# Patient Record
Sex: MALE | Race: WHITE | NOT HISPANIC OR LATINO | Employment: UNEMPLOYED | URBAN - NONMETROPOLITAN AREA
[De-identification: names, ages, dates, MRNs, and addresses within clinical notes are randomized per-mention and may not be internally consistent; named-entity substitution may affect disease eponyms.]

---

## 2017-01-04 ENCOUNTER — DOCUMENTATION (OUTPATIENT)
Dept: CARDIOLOGY | Facility: CLINIC | Age: 77
End: 2017-01-04

## 2017-01-04 NOTE — PROGRESS NOTES
Eliquis free of charge from 1/3/2017 - 12/31/2017 per [employer] patient assistance foundation

## 2017-03-22 ENCOUNTER — TRANSCRIBE ORDERS (OUTPATIENT)
Dept: CARDIOLOGY | Facility: CLINIC | Age: 77
End: 2017-03-22

## 2017-03-22 DIAGNOSIS — I34.0 NON-RHEUMATIC MITRAL REGURGITATION: ICD-10-CM

## 2017-03-22 DIAGNOSIS — I35.1 NONRHEUMATIC AORTIC VALVE INSUFFICIENCY: Primary | ICD-10-CM

## 2017-06-09 ENCOUNTER — OFFICE VISIT (OUTPATIENT)
Dept: CARDIOLOGY | Facility: CLINIC | Age: 77
End: 2017-06-09

## 2017-06-09 VITALS
HEIGHT: 72 IN | BODY MASS INDEX: 18.42 KG/M2 | SYSTOLIC BLOOD PRESSURE: 122 MMHG | HEART RATE: 56 BPM | DIASTOLIC BLOOD PRESSURE: 68 MMHG | WEIGHT: 136 LBS

## 2017-06-09 DIAGNOSIS — G45.9 TRANSIENT CEREBRAL ISCHEMIC ATTACK, UNSPECIFIED: ICD-10-CM

## 2017-06-09 DIAGNOSIS — I35.1 NONRHEUMATIC AORTIC VALVE INSUFFICIENCY: Primary | ICD-10-CM

## 2017-06-09 DIAGNOSIS — I34.0 NON-RHEUMATIC MITRAL REGURGITATION: ICD-10-CM

## 2017-06-09 DIAGNOSIS — I10 ESSENTIAL HYPERTENSION: ICD-10-CM

## 2017-06-09 PROCEDURE — 99213 OFFICE O/P EST LOW 20 MIN: CPT | Performed by: INTERNAL MEDICINE

## 2017-06-09 NOTE — PROGRESS NOTES
Babar Choi  76 y.o. male    06/09/2017  1. Nonrheumatic aortic valve insufficiency    2. Non-rheumatic mitral regurgitation    3. Essential hypertension    4. 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  Echocardiographic study at that time was reported normal left and a systolic function with a mild aortic regurgitation.  Recent echocardiographic study reported to have good heart function with a mild mitral aortic pulmonic and tricuspid regurgitation without any hemodynamic significant.  Finding discussed with the patient and family.  He denies orthopnea, PND, nauseous, vomiting, diarrhea.  Denies any intermittent claudication, dysuria or hematuria.            SUBJECTIVE    No Known Allergies      Past Medical History:   Diagnosis Date   • Cardiac disease    • Hiatal hernia    • Hyperlipidemia    • Hypertension    • Stroke    • Transient cerebral ischemic attack, unspecified          Past Surgical History:   Procedure Laterality Date   • SKIN LESION EXCISION           No family history on file.      Social History     Social History   • Marital status:      Spouse name: N/A   • Number of children: N/A   • Years of education: N/A     Occupational History   • Not on file.     Social History Main Topics   • Smoking status: Never Smoker   • Smokeless tobacco: Never Used   • Alcohol use No   • Drug use: No   • Sexual activity: Defer     Other Topics Concern   • Not on file     Social History Narrative         Current Outpatient Prescriptions   Medication Sig Dispense Refill   • apixaban (ELIQUIS) 2.5 MG tablet tablet Take 1 tablet by mouth 2 (Two) Times a Day. 180 tablet 3   • aspirin 81 MG chewable  "tablet Chew 81 mg Daily.     • carvedilol (COREG) 3.125 MG tablet TAKE ONE TABLET BY MOUTH TWICE DAILY 180 tablet 2   • Cholecalciferol (VITAMIN D3) 5000 UNITS capsule capsule Take 5,000 Units by mouth Daily.     • Flaxseed, Linseed, (FLAXSEED OIL) 1000 MG capsule Take 1 tablet by mouth Daily.     • lisinopril (PRINIVIL,ZESTRIL) 20 MG tablet TAKE ONE TABLET BY MOUTH ONCE DAILY 90 tablet 3   • Lutein 6 MG capsule Take 1 tablet by mouth Daily.     • vitamin C (ASCORBIC ACID) 500 MG tablet Take 500 mg by mouth Daily.       No current facility-administered medications for this visit.          OBJECTIVE    /68  Pulse 56  Ht 72\" (182.9 cm)  Wt 136 lb (61.7 kg)  BMI 18.44 kg/m2        Review of Systems     Constitutional:  Denies recent weight loss, weight gain, fever or chills, no change in exercise tolerance     HENT:  Denies any hearing loss, epistaxis, hoarseness, or difficulty speaking.     Eyes: Wears eyeglasses or contact lenses     Respiratory:  Denies dyspnea with exertion,no cough, wheezing, or hemoptysis.     Cardiovascular: See H&P.     Gastrointestinal:  Denies change in bowel habits, dyspepsia, ulcer disease, hematochezia, or melena.     Endocrine: Negative for cold intolerance, heat intolerance, polydipsia, polyphagia and polyuria. Denies any history of weight change, heat/cold intolerance, polydipsia, polyuria     Genitourinary: Negative.      Musculoskeletal: Denies any history of arthritic symptoms or back problems     Skin:  Denies any change in hair or nails, rashes, or skin lesions.     Allergic/Immunologic: Negative.  Negative for environmental allergies, food allergies and immunocompromised state.     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     Psychiatric/Behavioral: Denies any history of depression, substance abuse, or change in cognitive function. "         Physical Exam     Constitutional: Cooperative, alert and oriented, well-developed, well-nourished, in no acute distress.     HENT:   Head: Normocephalic, normal hair patterns, no masses or tenderness.  Ears, Nose, and Throat: No gross abnormalities. No pallor or cyanosis. Dentition good.   Eyes: EOMS intact, PERRL, conjunctivae and lids unremarkable. Fundoscopic exam and visual fields not performed.   Neck: No palpable masses or adenopathy, no thyromegaly, no JVD, carotid pulses are full and equal bilaterally and without  Bruits.     Cardiovascular: Regular rhythm, S1 and S2 normal, no S3 or S4. Apical impulse not displaced. No murmurs, gallops, or rubs detected.     Pulmonary/Chest: Chest: normal symmetry, no tenderness to palpation, normal respiratory excursion, no intercostal retraction, no use of accessory muscles.            Pulmonary: Normal breath sounds. No rales or ronchi.    Abdominal: Abdomen soft, bowel sounds normoactive, no masses, no hepatosplenomegaly, non-tender, no bruits.     Musculoskeletal: No deformities, clubbing, cyanosis, erythema, or edema observed. There are no spinal abnormalities noted. Normal muscle strength and tone. Pulses full and equal in all extremities, no bruits auscultated.     Neurological: No gross motor or sensory deficits noted, affect appropriate, oriented to time, person, place.     Skin: Warm and dry to the touch, no apparent skin lesions or masses noted.     Psychiatric: He has a normal mood and affect. His behavior is normal. Judgment and thought content normal.         Procedures      No results found for: WBC, HGB, HCT, MCV, PLT  No results found for: GLUCOSE, BUN, CREATININE, EGFRIFNONA, EGFRIFAFRI, BCR, CO2, CALCIUM, PROTENTOTREF, ALBUMIN, LABIL2, AST, ALT  No results found for: CHOL  No results found for: TRIG  No results found for: HDL  No results found for: LDLCALC  No results found for: LDL  No results found for: HDLLDLRATIO  No components found for:  CHOLHDL  No results found for: HGBA1C  No results found for: TSH, N5YMKHT, M1ZTLWB, THYROIDAB        ASSESSMENT AND PLAN  #1 hypertension with hypertensive heart disease #2 mild mitral, aortic, mitral and tricuspid regurgitation #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 NO Evidence of active ischemia.  The patient will continue lisinopril with history of hypertension with good blood pressure control and carvedilol for the same reason.   on apixaban and aspirin 81 mg to do to decrease risk of for blood clot formations given the history of for internal jugular vein thrombus.  We will see her back in 8 month to one year R depends on patient clinical conditions.  Recent echocardiographic finding discussed with the patient.    Diagnoses and all orders for this visit:    Nonrheumatic aortic valve insufficiency    Non-rheumatic mitral regurgitation    Essential hypertension    Transient cerebral ischemic attack, unspecified        Sacha Narvaez MD  6/9/2017  10:39 AM

## 2017-06-27 RX ORDER — CARVEDILOL 3.12 MG/1
3.12 TABLET ORAL 2 TIMES DAILY WITH MEALS
Qty: 180 TABLET | Refills: 2 | Status: SHIPPED | OUTPATIENT
Start: 2017-06-27 | End: 2018-03-22 | Stop reason: SDUPTHER

## 2017-06-27 RX ORDER — CARVEDILOL 3.12 MG/1
TABLET ORAL
Qty: 180 TABLET | Refills: 0 | Status: CANCELLED | OUTPATIENT
Start: 2017-06-27

## 2017-09-28 RX ORDER — LISINOPRIL 20 MG/1
TABLET ORAL
Qty: 90 TABLET | Refills: 3 | Status: SHIPPED | OUTPATIENT
Start: 2017-09-28 | End: 2018-09-14 | Stop reason: SDUPTHER

## 2017-12-06 ENCOUNTER — TELEPHONE (OUTPATIENT)
Dept: CARDIOLOGY | Facility: CLINIC | Age: 77
End: 2017-12-06

## 2017-12-11 ENCOUNTER — TELEPHONE (OUTPATIENT)
Dept: CARDIOLOGY | Facility: CLINIC | Age: 77
End: 2017-12-11

## 2018-03-22 RX ORDER — CARVEDILOL 3.12 MG/1
TABLET ORAL
Qty: 180 TABLET | Refills: 2 | Status: SHIPPED | OUTPATIENT
Start: 2018-03-22 | End: 2019-01-07 | Stop reason: SDUPTHER

## 2018-06-04 ENCOUNTER — OFFICE VISIT (OUTPATIENT)
Dept: CARDIOLOGY | Facility: CLINIC | Age: 78
End: 2018-06-04

## 2018-06-04 VITALS
HEIGHT: 72 IN | BODY MASS INDEX: 18.96 KG/M2 | SYSTOLIC BLOOD PRESSURE: 126 MMHG | DIASTOLIC BLOOD PRESSURE: 74 MMHG | WEIGHT: 140 LBS | HEART RATE: 55 BPM

## 2018-06-04 DIAGNOSIS — I34.0 NON-RHEUMATIC MITRAL REGURGITATION: ICD-10-CM

## 2018-06-04 DIAGNOSIS — I10 ESSENTIAL HYPERTENSION: ICD-10-CM

## 2018-06-04 DIAGNOSIS — I35.1 NONRHEUMATIC AORTIC VALVE INSUFFICIENCY: ICD-10-CM

## 2018-06-04 DIAGNOSIS — G45.9 TRANSIENT CEREBRAL ISCHEMIA, UNSPECIFIED TYPE: Primary | ICD-10-CM

## 2018-06-04 PROCEDURE — 99213 OFFICE O/P EST LOW 20 MIN: CPT | Performed by: INTERNAL MEDICINE

## 2018-06-04 PROCEDURE — 93000 ELECTROCARDIOGRAM COMPLETE: CPT | Performed by: INTERNAL MEDICINE

## 2018-06-04 NOTE — PROGRESS NOTES
Babar Choi  77 y.o. 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  Echocardiographic study at that time was reported normal left and a systolic function with a mild aortic regurgitation.  echocardiographic study reported to have good heart function with a mild mitral aortic pulmonic and tricuspid regurgitation without any hemodynamic significant.    He denies orthopnea, PND, nauseous, vomiting, diarrhea.  Denies any intermittent claudication, dysuria or hematuria.    ECHO 6/2017  · Mild tricuspid valve regurgitation is present.  · Mild mitral valve regurgitation is present  · calcification of the aortic valve  · Mild aortic valve regurgitation is present.  · Left atrial cavity size is borderline dilated.  · Left ventricular systolic function is normal. Estimated EF = 55%.  · Left ventricular diastolic dysfunction (grade I) consistent with impaired relaxation.  · Mild pulmonic valve regurgitation is present    SUBJECTIVE    No Known Allergies      Past Medical History:   Diagnosis Date   • Cardiac disease    • Hiatal hernia    • Hyperlipidemia    • Hypertension    • Stroke    • Transient cerebral ischemic attack, unspecified          Past Surgical History:   Procedure Laterality Date   • SKIN LESION EXCISION           History reviewed. No pertinent family history.      Social History     Social History   • Marital status:      Spouse name: N/A   • Number of children: N/A   • Years of education: N/A     Occupational History   • Not on file.     Social History Main Topics   • Smoking status: Never Smoker   •  "Smokeless tobacco: Never Used   • Alcohol use No   • Drug use: No   • Sexual activity: Defer     Other Topics Concern   • Not on file     Social History Narrative   • No narrative on file         Current Outpatient Prescriptions   Medication Sig Dispense Refill   • apixaban (ELIQUIS) 2.5 MG tablet tablet Take 1 tablet by mouth 2 (Two) Times a Day. 180 tablet 3   • aspirin 81 MG chewable tablet Chew 81 mg Daily.     • carvedilol (COREG) 3.125 MG tablet TAKE ONE TABLET BY MOUTH TWICE DAILY WITH MEALS 180 tablet 2   • Cholecalciferol (VITAMIN D3) 5000 UNITS capsule capsule Take 5,000 Units by mouth Daily.     • FISH OIL-CANOLA OIL-VIT D3 PO Take 1 tablet by mouth Daily.     • lisinopril (PRINIVIL,ZESTRIL) 20 MG tablet TAKE ONE TABLET BY MOUTH ONCE DAILY 90 tablet 3   • Lutein 6 MG capsule Take 1 tablet by mouth Daily.     • Multiple Vitamins-Minerals (COMPLETE MULTIVITAMIN/MINERAL PO) Take 1 tablet by mouth Daily.     • vitamin C (ASCORBIC ACID) 500 MG tablet Take 500 mg by mouth Daily.       No current facility-administered medications for this visit.          OBJECTIVE    /74   Pulse 55   Ht 182.9 cm (72.01\")   Wt 63.5 kg (140 lb)   BMI 18.98 kg/m²         Review of Systems     Constitutional:  Denies recent weight loss, weight gain, fever or chills, no change in exercise tolerance     HENT:  Denies any hearing loss, epistaxis, hoarseness, or difficulty speaking.     Eyes: Wears eyeglasses or contact lenses     Respiratory:  Denies dyspnea with exertion,no cough, wheezing, or hemoptysis.     Cardiovascular: Negative for palpations, chest pain, orthopnea, PND, peripheral edema, syncope, or claudication.     Gastrointestinal:  Denies change in bowel habits, dyspepsia, ulcer disease, hematochezia, or melena.     Endocrine: Negative for cold intolerance, heat intolerance, polydipsia, polyphagia and polyuria. Denies any history of weight change, heat/cold intolerance, polydipsia, polyuria     Genitourinary: " Negative.      Musculoskeletal: Denies any history of arthritic symptoms or back problems     Skin:  Denies any change in hair or nails, rashes, or skin lesions.     Allergic/Immunologic: Negative.  Negative for environmental allergies, food allergies and immunocompromised state.     Neurological:  TIA    Hematological: Denies any food allergies, seasonal allergies, bleeding disorders, or lymphadenopathy.     Psychiatric/Behavioral: Denies any history of depression, substance abuse, or change in cognitive function.         Physical Exam     Constitutional: Cooperative, alert and oriented, well-developed, well-nourished, in no acute distress.     HENT:   Head: Normocephalic, normal hair patterns, no masses or tenderness.  Ears, Nose, and Throat: No gross abnormalities. No pallor or cyanosis. Dentition good.   Eyes: EOMS intact, PERRL, conjunctivae and lids unremarkable. Fundoscopic exam and visual fields not performed.   Neck: No palpable masses or adenopathy, no thyromegaly, no JVD, carotid pulses are full and equal bilaterally and without  Bruits.     Cardiovascular: Regular rhythm, S1 and S2 normal, no S3 or S4. Apical impulse not displaced. No murmurs, gallops, or rubs detected.     Pulmonary/Chest: Chest: normal symmetry, no tenderness to palpation, normal respiratory excursion, no intercostal retraction, no use of accessory muscles.            Pulmonary: Normal breath sounds. No rales or ronchi.    Abdominal: Abdomen soft, bowel sounds normoactive, no masses, no hepatosplenomegaly, non-tender, no bruits.     Musculoskeletal: No deformities, clubbing, cyanosis, erythema, or edema observed. There are no spinal abnormalities noted. Normal muscle strength and tone. Pulses full and equal in all extremities, no bruits auscultated.     Neurological: No gross motor or sensory deficits noted, affect appropriate, oriented to time, person, place.     Skin: Warm and dry to the touch, no apparent skin lesions or masses  noted.     Psychiatric: He has a normal mood and affect. His behavior is normal. Judgment and thought content normal.           ECG 12 Lead  Date/Time: 6/4/2018 1:44 PM  Performed by: SACHA SARKAR  Authorized by: SACHA SARKAR   Comparison: not compared with previous ECG   Rhythm: sinus rhythm  Conduction: incomplete RBBB  Comments: Sinus rhythm with incomplete right bundle-branch block morphology              No results found for: WBC, HGB, HCT, MCV, PLT  No results found for: GLUCOSE, BUN, CREATININE, EGFRIFNONA, EGFRIFAFRI, BCR, CO2, CALCIUM, PROTENTOTREF, ALBUMIN, LABIL2, AST, ALT  No results found for: CHOL  No results found for: TRIG  No results found for: HDL  No components found for: LDLCALC  No results found for: LDL  No results found for: HDLLDLRATIO  No components found for: CHOLHDL  No results found for: HGBA1C  No results found for: TSH, J4SYBGV, Q1NDNZY, THYROIDAB        ASSESSMENT AND PLAN  1 hypertension with hypertensive heart disease #2 mild mitral, aortic, mitral and tricuspid regurgitation #3 TIA secondary #4 internal jugular vein thrombus     Clinically no sign of any acute cardiac decompensation based the clinical history physical finding. NO Evidence of active ischemia.  The patient will continue lisinopril with history of hypertension with good blood pressure control and carvedilol for the same reason.   on apixaban and aspirin 81 mg to do to decrease risk of for blood clot formations given the history of for internal jugular vein thrombus.  We will see her back in 8 month to one year R depends on patient clinical conditions.  Recent echocardiographic finding discussed with the patient.    There are no diagnoses linked to this encounter.    Sacha Sarkar MD  6/4/2018  1:43 PM

## 2018-09-14 RX ORDER — LISINOPRIL 20 MG/1
TABLET ORAL
Qty: 90 TABLET | Refills: 3 | Status: SHIPPED | OUTPATIENT
Start: 2018-09-14 | End: 2019-09-16 | Stop reason: SDUPTHER

## 2018-12-10 ENCOUNTER — DOCUMENTATION (OUTPATIENT)
Dept: CARDIOLOGY | Facility: CLINIC | Age: 78
End: 2018-12-10

## 2018-12-10 NOTE — PROGRESS NOTES
Patient receiving assistance for Eliquis for the 2018 year.  Explained to patient I cannot fill out for his 2019 assistance until I get his 2018 taxes when he files them.  I told him if he needed samples between now and that to call me.  Patient states he has plenty and will call me when he does his taxes.

## 2018-12-19 ENCOUNTER — DOCUMENTATION (OUTPATIENT)
Dept: CARDIOLOGY | Facility: CLINIC | Age: 78
End: 2018-12-19

## 2019-01-07 RX ORDER — CARVEDILOL 3.12 MG/1
TABLET ORAL
Qty: 180 TABLET | Refills: 2 | Status: SHIPPED | OUTPATIENT
Start: 2019-01-07 | End: 2019-09-16 | Stop reason: SDUPTHER

## 2019-02-06 ENCOUNTER — DOCUMENTATION (OUTPATIENT)
Dept: CARDIOLOGY | Facility: CLINIC | Age: 79
End: 2019-02-06

## 2019-02-06 ENCOUNTER — OFFICE VISIT (OUTPATIENT)
Dept: CARDIOLOGY | Facility: CLINIC | Age: 79
End: 2019-02-06

## 2019-02-06 VITALS
WEIGHT: 138 LBS | BODY MASS INDEX: 18.69 KG/M2 | SYSTOLIC BLOOD PRESSURE: 128 MMHG | OXYGEN SATURATION: 99 % | HEIGHT: 72 IN | HEART RATE: 54 BPM | DIASTOLIC BLOOD PRESSURE: 72 MMHG

## 2019-02-06 DIAGNOSIS — I34.0 NON-RHEUMATIC MITRAL REGURGITATION: ICD-10-CM

## 2019-02-06 DIAGNOSIS — I10 ESSENTIAL HYPERTENSION: ICD-10-CM

## 2019-02-06 DIAGNOSIS — G45.9 TRANSIENT CEREBRAL ISCHEMIA, UNSPECIFIED TYPE: Primary | ICD-10-CM

## 2019-02-06 DIAGNOSIS — I35.1 NONRHEUMATIC AORTIC VALVE INSUFFICIENCY: ICD-10-CM

## 2019-02-06 PROCEDURE — 99213 OFFICE O/P EST LOW 20 MIN: CPT | Performed by: INTERNAL MEDICINE

## 2019-02-06 NOTE — PROGRESS NOTES
Babar Choi  78 y.o. male    02/06/2019  1. Transient cerebral ischemia, unspecified type    2. Nonrheumatic aortic valve insufficiency    3. Non-rheumatic mitral regurgitation    4. Essential hypertension        History of Present Illness:  .Patient's Body mass index is 18.71 kg/m². BMI is within normal parameters. No follow-up required..  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  Echocardiographic study at that time  reported normal left and a systolic function  with a mild mitral aortic pulmonic and tricuspid regurgitation without any hemodynamic significant.    He denies orthopnea, PND, nauseous, vomiting, diarrhea.  Denies any intermittent claudication, dysuria or hematuria.     ECHO 6/2017  · Mild tricuspid valve regurgitation is present.  · Mild mitral valve regurgitation is present  · calcification of the aortic valve  · Mild aortic valve regurgitation is present.  · Left atrial cavity size is borderline dilated.  · Left ventricular systolic function is normal. Estimated EF = 55%.  · Left ventricular diastolic dysfunction (grade I) consistent with impaired relaxation.  · Mild pulmonic valve regurgitation is present            SUBJECTIVE:    No Known Allergies      Past Medical History:   Diagnosis Date   • Cardiac disease    • Hiatal hernia    • Hyperlipidemia    • Hypertension    • Stroke (CMS/HCC)    • Transient cerebral ischemic attack, unspecified          Past Surgical History:   Procedure Laterality Date   • SKIN LESION EXCISION           No family history on file.      Social History     Socioeconomic History   • Marital status:      Spouse name: Not on file   • Number of children: Not on file   • Years of education: Not on file   • Highest education level: Not on file   Social Needs   • Financial  resource strain: Not on file   • Food insecurity - worry: Not on file   • Food insecurity - inability: Not on file   • Transportation needs - medical: Not on file   • Transportation needs - non-medical: Not on file   Occupational History   • Not on file   Tobacco Use   • Smoking status: Never Smoker   • Smokeless tobacco: Never Used   Substance and Sexual Activity   • Alcohol use: No   • Drug use: No   • Sexual activity: Defer   Other Topics Concern   • Not on file   Social History Narrative   • Not on file         Current Outpatient Medications   Medication Sig Dispense Refill   • apixaban (ELIQUIS) 2.5 MG tablet tablet Take 1 tablet by mouth Every 12 (Twelve) Hours. 180 tablet 4   • aspirin 81 MG chewable tablet Chew 81 mg Daily.     • carvedilol (COREG) 3.125 MG tablet TAKE 1 TABLET BY MOUTH TWICE DAILY WITH MEALS 180 tablet 2   • Cholecalciferol (VITAMIN D3) 5000 UNITS capsule capsule Take 5,000 Units by mouth Daily.     • FISH OIL-CANOLA OIL-VIT D3 PO Take 1 tablet by mouth Daily.     • lisinopril (PRINIVIL,ZESTRIL) 20 MG tablet TAKE ONE TABLET BY MOUTH ONCE DAILY 90 tablet 3   • Lutein 6 MG capsule Take 1 tablet by mouth Daily.     • Multiple Vitamins-Minerals (COMPLETE MULTIVITAMIN/MINERAL PO) Take 1 tablet by mouth Daily.     • vitamin C (ASCORBIC ACID) 500 MG tablet Take 500 mg by mouth Daily.       No current facility-administered medications for this visit.            Review of Systems:     Constitutional:  Denies recent weight loss, weight gain, fever or chills, no change in exercise tolerance.     HENT:  Denies any hearing loss, epistaxis, hoarseness, or difficulty speaking.     Eyes: Wears eyeglasses or contact lenses.    Respiratory:  Denies dyspnea with exertion,no cough, wheezing, or hemoptysis.     Cardiovascular: Negative for palpations, chest pain, orthopnea, PND, peripheral edema, syncope, or claudication.     Gastrointestinal:  Denies change in bowel habits, dyspepsia, ulcer disease,  "hematochezia, or melena.     Endocrine: Negative for cold intolerance, heat intolerance, polydipsia, polyphagia and polyuria. Denies any history of weight change, polydipsia, polyuria.     Genitourinary: Negative.      Musculoskeletal: Denies any history of arthritic symptoms or back problems.     Skin:  Denies any change in hair or nails, rashes, or skin lesions.     Allergic/Immunologic: Negative.  Negative for environmental allergies, food allergies and immunocompromised state.     Neurological:  Possible TIA     Hematological: Denies any food allergies, seasonal allergies, bleeding disorders, or lymphadenopathy.     Psychiatric/Behavioral: Denies any history of depression, substance abuse, or change in cognitive function.       OBJECTIVE:    /72   Pulse 54   Ht 182.9 cm (72.01\")   Wt 62.6 kg (138 lb)   SpO2 99%   BMI 18.71 kg/m²       Physical Exam:     Constitutional: Cooperative, alert and oriented, well-developed, well-nourished, in no acute distress.     HENT:   Head: Normocephalic, normal hair patterns, no masses or tenderness.  Ears, Nose, and Throat: No gross abnormalities. No pallor or cyanosis. Dentition good.   Eyes: EOMS intact, PERRL, conjunctivae and lids unremarkable. Fundoscopic exam and visual fields not performed.   Neck: No palpable masses or adenopathy, no thyromegaly, no JVD, carotid pulses are full and equal bilaterally and without  Bruits.     Cardiovascular: Regular rhythm, S1 and S2 normal, no S3 or S4. Apical impulse not displaced. No murmurs, gallops, or rubs detected.     Pulmonary/Chest: Chest: normal symmetry, no tenderness to palpation, normal respiratory excursion, no intercostal retraction, no use of accessory muscles. Pulmonary: Normal breath sounds. No rales or rhonchi.    Abdominal: Abdomen soft, bowel sounds normoactive, no masses, no hepatosplenomegaly, non-tender, no bruits.     Musculoskeletal: No deformities, clubbing, cyanosis, erythema, or edema observed. " There are no spinal abnormalities noted. Normal muscle strength and tone. Pulses full and equal in all extremities, no bruits auscultated.     Neurological: No gross motor or sensory deficits noted, affect appropriate, oriented to time, person, place.     Skin: Warm and dry to the touch, no apparent skin lesions or masses noted.     Psychiatric: He has a normal mood and affect. His behavior is normal. Judgment and thought content normal.         Procedures      No results found for: WBC, HGB, HCT, MCV, PLT  No results found for: GLUCOSE, BUN, CREATININE, EGFRIFNONA, EGFRIFAFRI, BCR, CO2, CALCIUM, PROTENTOTREF, ALBUMIN, LABIL2, AST, ALT  No results found for: CHOL  No results found for: TRIG  No results found for: HDL  No components found for: LDLCALC  No results found for: LDL  No results found for: HDLLDLRATIO  No components found for: CHOLHDL  No results found for: HGBA1C  No results found for: TSH, D4KWSEX, P2ZJUKO, THYROIDAB        ASSESSMENT AND PLAN:    1 hypertension with hypertensive heart disease #2 mild mitral, aortic, mitral and tricuspid regurgitation #3 TIA secondary #4 internal jugular vein thrombus     Clinically no sign of any acute cardiac decompensation based the clinical history physical finding. NO Evidence of active ischemia.  The patient will continue lisinopril with history of hypertension with good blood pressure control and carvedilol for the same reason.   on apixaban and aspirin 81 mg to do to decrease risk of for blood clot formations given the history of for internal jugular vein thrombus.  We will see her back in 8 month to one year R depends on patient clinical conditions.  Recent echocardiographic finding discussed with the patient.      Babar was seen today for follow-up.    Diagnoses and all orders for this visit:    Transient cerebral ischemia, unspecified type    Nonrheumatic aortic valve insufficiency    Non-rheumatic mitral regurgitation    Essential hypertension        Goncalves  MD Brice  2/6/2019  8:56 AM

## 2019-09-16 RX ORDER — CARVEDILOL 3.12 MG/1
TABLET ORAL
Qty: 180 TABLET | Refills: 2 | Status: SHIPPED | OUTPATIENT
Start: 2019-09-16 | End: 2020-06-08

## 2019-09-16 RX ORDER — LISINOPRIL 20 MG/1
TABLET ORAL
Qty: 90 TABLET | Refills: 3 | Status: SHIPPED | OUTPATIENT
Start: 2019-09-16 | End: 2020-06-08

## 2019-10-08 ENCOUNTER — OFFICE VISIT (OUTPATIENT)
Dept: CARDIOLOGY | Facility: CLINIC | Age: 79
End: 2019-10-08

## 2019-10-08 VITALS
WEIGHT: 122 LBS | HEIGHT: 72 IN | DIASTOLIC BLOOD PRESSURE: 72 MMHG | OXYGEN SATURATION: 99 % | HEART RATE: 52 BPM | SYSTOLIC BLOOD PRESSURE: 134 MMHG | BODY MASS INDEX: 16.52 KG/M2

## 2019-10-08 DIAGNOSIS — G45.9 TRANSIENT CEREBRAL ISCHEMIA, UNSPECIFIED TYPE: Primary | ICD-10-CM

## 2019-10-08 DIAGNOSIS — I10 ESSENTIAL HYPERTENSION: ICD-10-CM

## 2019-10-08 DIAGNOSIS — I35.1 NONRHEUMATIC AORTIC VALVE INSUFFICIENCY: ICD-10-CM

## 2019-10-08 DIAGNOSIS — I34.0 NON-RHEUMATIC MITRAL REGURGITATION: ICD-10-CM

## 2019-10-08 PROCEDURE — 99213 OFFICE O/P EST LOW 20 MIN: CPT | Performed by: INTERNAL MEDICINE

## 2019-10-08 NOTE — PROGRESS NOTES
Babar Choi  79 y.o. male    10/08/2019  1. Transient cerebral ischemia, unspecified type    2. Essential hypertension    3. Non-rheumatic mitral regurgitation    4. Nonrheumatic aortic valve insufficiency        History of Present Illness:    Patient's Body mass index is 16.55 kg/m². BMI is below normal parameters. Recommendations include: referral to primary care.    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  Echocardiographic study at that time  reported normal left and a systolic function  with a mild mitral aortic pulmonic and tricuspid regurgitation without any hemodynamic significant.    He denies orthopnea, PND, nauseous, vomiting, diarrhea.  Denies any intermittent claudication, dysuria or hematuria.     ECHO 6/2017  · Mild tricuspid valve regurgitation is present.  · Mild mitral valve regurgitation is present  · calcification of the aortic valve  · Mild aortic valve regurgitation is present.  · Left atrial cavity size is borderline dilated.  · Left ventricular systolic function is normal. Estimated EF = 55%.  · Left ventricular diastolic dysfunction (grade I) consistent with impaired relaxation.  · Mild pulmonic valve regurgitation is present    SUBJECTIVE:    No Known Allergies      Past Medical History:   Diagnosis Date   • Cardiac disease    • Hiatal hernia    • Hyperlipidemia    • Hypertension    • Stroke (CMS/HCC)    • Transient cerebral ischemic attack, unspecified          Past Surgical History:   Procedure Laterality Date   • SKIN LESION EXCISION           History reviewed. No pertinent family history.      Social History     Socioeconomic History   • Marital status:      Spouse name: Not on file   • Number of children: Not on file   • Years of education: Not on file   • Highest education level: Not  on file   Tobacco Use   • Smoking status: Never Smoker   • Smokeless tobacco: Never Used   Substance and Sexual Activity   • Alcohol use: No   • Drug use: No   • Sexual activity: Defer         Current Outpatient Medications   Medication Sig Dispense Refill   • apixaban (ELIQUIS) 2.5 MG tablet tablet Take 1 tablet by mouth Every 12 (Twelve) Hours. 180 tablet 4   • aspirin 81 MG chewable tablet Chew 81 mg Daily.     • carvedilol (COREG) 3.125 MG tablet TAKE 1 TABLET BY MOUTH TWICE DAILY WITH MEALS 180 tablet 2   • Cholecalciferol (VITAMIN D3) 5000 UNITS capsule capsule Take 5,000 Units by mouth Daily.     • FISH OIL-CANOLA OIL-VIT D3 PO Take 1 tablet by mouth Daily.     • lisinopril (PRINIVIL,ZESTRIL) 20 MG tablet TAKE 1 TABLET BY MOUTH ONCE DAILY 90 tablet 3   • Lutein 6 MG capsule Take 1 tablet by mouth Daily.     • Multiple Vitamins-Minerals (COMPLETE MULTIVITAMIN/MINERAL PO) Take 1 tablet by mouth Daily. Patient takes it only 3 days per week     • vitamin C (ASCORBIC ACID) 500 MG tablet Take 500 mg by mouth Daily.       No current facility-administered medications for this visit.            Review of Systems:     Constitutional:  Denies recent weight loss, weight gain, fever or chills, no change in exercise tolerance.     HENT:  Denies any hearing loss, epistaxis, hoarseness, or difficulty speaking.     Eyes: Wears eyeglasses or contact lenses.    Respiratory:  Denies dyspnea with exertion,no cough, wheezing, or hemoptysis.     Cardiovascular: Negative for palpations, chest pain, orthopnea, PND, peripheral edema, syncope, or claudication.     Gastrointestinal:  Denies change in bowel habits, dyspepsia, ulcer disease, hematochezia, or melena.     Endocrine: Negative for cold intolerance, heat intolerance, polydipsia, polyphagia and polyuria. Denies any history of weight change, polydipsia, polyuria.     Genitourinary: Negative.      Musculoskeletal: Denies any history of arthritic symptoms or back problems.  "    Skin:  Denies any change in hair or nails, rashes, or skin lesions.     Allergic/Immunologic: Negative.  Negative for environmental allergies, food allergies and immunocompromised state.     Neurological: TIA    Hematological: Denies any food allergies, seasonal allergies, bleeding disorders, or lymphadenopathy.     Psychiatric/Behavioral: Denies any history of depression, substance abuse, or change in cognitive function.       OBJECTIVE:    /72   Pulse 52   Ht 182.9 cm (72\")   Wt 55.3 kg (122 lb)   SpO2 99%   BMI 16.55 kg/m²       Physical Exam:     Constitutional: Cooperative, alert and oriented, well-developed, well-nourished, in no acute distress.     HENT:   Head: Normocephalic, normal hair patterns, no masses or tenderness.  Ears, Nose, and Throat: No gross abnormalities. No pallor or cyanosis. Dentition good.   Eyes: EOMS intact, PERRL, conjunctivae and lids unremarkable. Fundoscopic exam and visual fields not performed.   Neck: No palpable masses or adenopathy, no thyromegaly, no JVD, carotid pulses are full and equal bilaterally and without  Bruits.     Cardiovascular: Regular rhythm, S1 and S2 normal, no S3 or S4. Apical impulse not displaced. No murmurs, gallops, or rubs detected.     Pulmonary/Chest: Chest: normal symmetry, no tenderness to palpation, normal respiratory excursion, no intercostal retraction, no use of accessory muscles. Pulmonary: Normal breath sounds. No rales or rhonchi.    Abdominal: Abdomen soft, bowel sounds normoactive, no masses, no hepatosplenomegaly, non-tender, no bruits.     Musculoskeletal: No deformities, clubbing, cyanosis, erythema, or edema observed. There are no spinal abnormalities noted. Normal muscle strength and tone. Pulses full and equal in all extremities, no bruits auscultated.     Neurological: No gross motor or sensory deficits noted, affect appropriate, oriented to time, person, place.     Skin: Warm and dry to the touch, no apparent skin lesions " or masses noted.     Psychiatric: He has a normal mood and affect. His behavior is normal. Judgment and thought content normal.         Procedures      No results found for: WBC, HGB, HCT, MCV, PLT  No results found for: GLUCOSE, BUN, CREATININE, EGFRIFNONA, EGFRIFAFRI, BCR, CO2, CALCIUM, PROTENTOTREF, ALBUMIN, LABIL2, AST, ALT  No results found for: CHOL  No results found for: TRIG  No results found for: HDL  No components found for: LDLCALC  No results found for: LDL  No results found for: HDLLDLRATIO  No components found for: CHOLHDL  No results found for: HGBA1C  No results found for: TSH, F8FUNML, P5ITTQV, THYROIDAB        ASSESSMENT AND PLAN:    1 hypertension with hypertensive heart disease   #2 mild mitral, aortic, mitral and tricuspid regurgitation   #3 TIA secondary #4 internal jugular vein thrombus     Clinically no sign of any acute cardiac decompensation based the clinical history physical finding. NO Evidence of active ischemia.  The patient will continue lisinopril with history of hypertension with good blood pressure control and carvedilol for the same reason.   on apixaban and aspirin 81 mg to do to decrease risk of for blood clot formations given the history of for internal jugular vein thrombus.  We will see her back in 8 month to one year R depends on patient clinical conditions.      Babar was seen today for follow-up.    Diagnoses and all orders for this visit:    Transient cerebral ischemia, unspecified type    Essential hypertension    Non-rheumatic mitral regurgitation    Nonrheumatic aortic valve insufficiency        Sacha Narvaez MD  10/8/2019  11:09 AM

## 2019-12-02 ENCOUNTER — TELEPHONE (OUTPATIENT)
Dept: CARDIOLOGY | Facility: CLINIC | Age: 79
End: 2019-12-02

## 2019-12-02 NOTE — TELEPHONE ENCOUNTER
I called patient and let him know I got his papers and will be sending them in this week.         ----- Message from Miryam Uriarte sent at 12/2/2019  2:48 PM CST -----  Regarding: papers  Contact: [phone]  Pt called wanting to know if you got his papers he left and sent back in yet

## 2019-12-12 ENCOUNTER — DOCUMENTATION (OUTPATIENT)
Dept: CARDIOLOGY | Facility: CLINIC | Age: 79
End: 2019-12-12

## 2020-01-07 ENCOUNTER — DOCUMENTATION (OUTPATIENT)
Dept: CARDIOLOGY | Facility: CLINIC | Age: 80
End: 2020-01-07

## 2020-01-07 NOTE — PROGRESS NOTES
Patient's assistance for Eliquis has bee approved through 12/31/2020.  Approval has been put into scan.

## 2020-06-08 RX ORDER — LISINOPRIL 20 MG/1
TABLET ORAL
Qty: 90 TABLET | Refills: 0 | Status: SHIPPED | OUTPATIENT
Start: 2020-06-08 | End: 2020-12-03

## 2020-06-08 RX ORDER — CARVEDILOL 3.12 MG/1
TABLET ORAL
Qty: 180 TABLET | Refills: 0 | Status: SHIPPED | OUTPATIENT
Start: 2020-06-08 | End: 2020-09-21

## 2020-06-16 DIAGNOSIS — G45.9 TRANSIENT CEREBRAL ISCHEMIA, UNSPECIFIED TYPE: ICD-10-CM

## 2020-06-16 DIAGNOSIS — I10 ESSENTIAL HYPERTENSION: Primary | ICD-10-CM

## 2020-06-17 ENCOUNTER — OFFICE VISIT (OUTPATIENT)
Dept: CARDIOLOGY | Facility: CLINIC | Age: 80
End: 2020-06-17

## 2020-06-17 VITALS
BODY MASS INDEX: 18.56 KG/M2 | WEIGHT: 137 LBS | HEART RATE: 59 BPM | SYSTOLIC BLOOD PRESSURE: 122 MMHG | HEIGHT: 72 IN | OXYGEN SATURATION: 99 % | DIASTOLIC BLOOD PRESSURE: 72 MMHG

## 2020-06-17 DIAGNOSIS — I34.0 NON-RHEUMATIC MITRAL REGURGITATION: ICD-10-CM

## 2020-06-17 DIAGNOSIS — I35.1 NONRHEUMATIC AORTIC VALVE INSUFFICIENCY: ICD-10-CM

## 2020-06-17 DIAGNOSIS — G45.9 TRANSIENT CEREBRAL ISCHEMIA, UNSPECIFIED TYPE: ICD-10-CM

## 2020-06-17 DIAGNOSIS — I10 ESSENTIAL HYPERTENSION: Primary | ICD-10-CM

## 2020-06-17 PROCEDURE — 99213 OFFICE O/P EST LOW 20 MIN: CPT | Performed by: INTERNAL MEDICINE

## 2020-06-17 PROCEDURE — 93000 ELECTROCARDIOGRAM COMPLETE: CPT | Performed by: INTERNAL MEDICINE

## 2020-06-17 NOTE — PROGRESS NOTES
Babar Choi  79 y.o. male    6/17/2020     1. Essential hypertension    2. Non-rheumatic mitral regurgitation    3. Nonrheumatic aortic valve insufficiency    4. Transient cerebral ischemia, unspecified type        History of Present Illness:    Patient's Body mass index is 18.58 kg/m². BMI is below normal parameters. Recommendations include: referral to primary care.    80 years old patient presented for routine follow-up from a cardiac point of view no pain remarkably well.  No further TIA symptoms reported by the patient evaluated by me at [facility] when he admitted with the lightheaded and dizziness thought to be TIA underwent neurological workup noted to have internal jugular vein thrombus started on anticoagulations.  Echo  reported normal left and a systolic function  with a mild mitral aortic pulmonic and tricuspid regurgitation without any hemodynamic significant.    He denies orthopnea, PND, nauseous, vomiting, diarrhea.  Denies any intermittent claudication, dysuria or hematuria.     ECHO 6/2017  · Mild tricuspid valve regurgitation is present.  · Mild mitral valve regurgitation is present  · calcification of the aortic valve  · Mild aortic valve regurgitation is present.  · Left atrial cavity size is borderline dilated.  · Left ventricular systolic function is normal. Estimated EF = 55%.  · Left ventricular diastolic dysfunction (grade I) consistent with impaired relaxation.  · Mild pulmonic valve regurgitation is present    SUBJECTIVE:    No Known Allergies      Past Medical History:   Diagnosis Date   • Cardiac disease    • Hiatal hernia    • Hyperlipidemia    • Hypertension    • Stroke (CMS/HCC)    • Transient cerebral ischemic attack, unspecified          Past Surgical History:   Procedure Laterality Date   • SKIN LESION EXCISION           History reviewed. No pertinent family history.      Social History     Socioeconomic History   • Marital status:      Spouse name: Not on file    • Number of children: Not on file   • Years of education: Not on file   • Highest education level: Not on file   Tobacco Use   • Smoking status: Never Smoker   • Smokeless tobacco: Never Used   Substance and Sexual Activity   • Alcohol use: No   • Drug use: No   • Sexual activity: Defer         Current Outpatient Medications   Medication Sig Dispense Refill   • apixaban (ELIQUIS) 2.5 MG tablet tablet Take 1 tablet by mouth Every 12 (Twelve) Hours. 180 tablet 3   • aspirin 81 MG chewable tablet Chew 81 mg Daily.     • carvedilol (COREG) 3.125 MG tablet TAKE 1 TABLET BY MOUTH TWICE DAILY WITH MEALS 180 tablet 0   • Cholecalciferol (VITAMIN D3) 5000 UNITS capsule capsule Take 5,000 Units by mouth Daily.     • FISH OIL-CANOLA OIL-VIT D3 PO Take 1 tablet by mouth Daily.     • lisinopril (PRINIVIL,ZESTRIL) 20 MG tablet Take 1 tablet by mouth once daily 90 tablet 0   • Lutein 6 MG capsule Take 1 tablet by mouth Daily.     • Multiple Vitamins-Minerals (COMPLETE MULTIVITAMIN/MINERAL PO) Take 1 tablet by mouth Daily. Patient takes it only 3 days per week     • vitamin C (ASCORBIC ACID) 500 MG tablet Take 500 mg by mouth Daily.       No current facility-administered medications for this visit.            Review of Systems:     Constitutional:  Denies recent weight loss, weight gain, fever or chills, no change in exercise tolerance.     HENT:  Denies any hearing loss, epistaxis, hoarseness, or difficulty speaking.     Eyes: No blurring    Respiratory:  Denies dyspnea with exertion,no cough, wheezing, or hemoptysis.     Cardiovascular: See H&P  Gastrointestinal:  Denies change in bowel habits, dyspepsia, ulcer disease, hematochezia, or melena.     Endocrine: Negative for cold intolerance, heat intolerance, polydipsia, polyphagia and polyuria. Denies any history of weight change, polydipsia, polyuria.     Genitourinary: Negative.      Musculoskeletal: Denies any history of arthritic symptoms or back problems.     Skin:  Denies  "any change in hair or nails, rashes, or skin lesions.     Allergic/Immunologic: Negative.  Negative for environmental allergies, food allergies and immunocompromised state.     Neurological: TIA    Hematological: Denies any food allergies, seasonal allergies, bleeding disorders, or lymphadenopathy.     Psychiatric/Behavioral: Denies any history of depression, substance abuse, or change in cognitive function.       OBJECTIVE:    /72   Pulse 59   Ht 182.9 cm (72\")   Wt 62.1 kg (137 lb)   SpO2 99%   BMI 18.58 kg/m²       Physical Exam:     Constitutional: Cooperative, alert and oriented, well-developed, well-nourished, in no acute distress.     HENT:   Head: Normocephalic, normal hair patterns, no masses or tenderness.  Ears, Nose, and Throat: No gross abnormalities. No pallor or cyanosis. Dentition good.   Eyes: EOMS intact, PERRL, conjunctivae and lids unremarkable. Fundoscopic exam and visual fields not performed.   Neck: No palpable masses or adenopathy, no thyromegaly, no JVD, carotid pulses are full and equal bilaterally and without  Bruits.     Cardiovascular: Regular rhythm, S1 and S2 normal, no S3 or S4. Apical impulse not displaced. No murmurs, gallops, or rubs detected.     Pulmonary/Chest: Chest: normal symmetry, no tenderness to palpation, normal respiratory excursion, no intercostal retraction, no use of accessory muscles. Pulmonary: Normal breath sounds. No rales or rhonchi.    Abdominal: Abdomen soft, bowel sounds normoactive, no masses, no hepatosplenomegaly, non-tender, no bruits.     Musculoskeletal: No deformities, clubbing, cyanosis, erythema, or edema observed. There are no spinal abnormalities noted. Normal muscle strength and tone. Pulses full and equal in all extremities, no bruits auscultated.     Neurological: No gross motor or sensory deficits noted, affect appropriate, oriented to time, person, place.     Skin: Warm and dry to the touch, no apparent skin lesions or masses noted. "     Psychiatric: He has a normal mood and affect. His behavior is normal. Judgment and thought content normal.         Procedures      No results found for: WBC, HGB, HCT, MCV, PLT  No results found for: GLUCOSE, BUN, CREATININE, EGFRIFNONA, EGFRIFAFRI, BCR, CO2, CALCIUM, PROTENTOTREF, ALBUMIN, LABIL2, BILIRUBIN, AST, ALT  No results found for: CHOL  No results found for: TRIG  No results found for: HDL  No components found for: LDLCALC  No results found for: LDL  No results found for: HDLLDLRATIO  No components found for: CHOLHDL  No results found for: HGBA1C  No results found for: TSH, F5VHYLP, I2SBADN, THYROIDAB        ASSESSMENT AND PLAN:    1 hypertension with hypertensive heart disease good blood pressure control will continue lisinopril and carvedilol  #2 mild mitral, aortic, mitral and tricuspid regurgitation   #3 TIA no further recurrence on anticoagulation   #4 internal jugular vein thrombus on anticoagulation     Clinically no sign of any acute cardiac decompensation based the clinical history physical finding. NO Evidence of active ischemia.  The patient will continue lisinopril with history of hypertension with good blood pressure control and carvedilol for the same reason.   on apixaban and aspirin 81 mg to do to decrease risk of for blood clot formations given the history of for internal jugular vein thrombus.  We will see her back in 12 month  oR depends on patient clinical conditions.      Babar was seen today for transient cerebral ischemia, unspecified type +3 more.    Diagnoses and all orders for this visit:    Essential hypertension    Non-rheumatic mitral regurgitation    Nonrheumatic aortic valve insufficiency    Transient cerebral ischemia, unspecified type        Sacha Narvaez MD  6/17/2020  10:16

## 2020-09-21 RX ORDER — CARVEDILOL 3.12 MG/1
TABLET ORAL
Qty: 180 TABLET | Refills: 0 | Status: SHIPPED | OUTPATIENT
Start: 2020-09-21 | End: 2020-12-03

## 2020-12-03 RX ORDER — LISINOPRIL 20 MG/1
TABLET ORAL
Qty: 90 TABLET | Refills: 0 | Status: SHIPPED | OUTPATIENT
Start: 2020-12-03 | End: 2021-03-15 | Stop reason: SDUPTHER

## 2020-12-03 RX ORDER — CARVEDILOL 3.12 MG/1
TABLET ORAL
Qty: 180 TABLET | Refills: 0 | Status: SHIPPED | OUTPATIENT
Start: 2020-12-03 | End: 2021-03-15 | Stop reason: SDUPTHER

## 2020-12-09 RX ORDER — APIXABAN 2.5 MG/1
TABLET, FILM COATED ORAL
Qty: 180 TABLET | Refills: 0 | Status: SHIPPED | OUTPATIENT
Start: 2020-12-09 | End: 2021-01-04 | Stop reason: SDUPTHER

## 2021-01-06 ENCOUNTER — TELEPHONE (OUTPATIENT)
Dept: CARDIOLOGY | Facility: CLINIC | Age: 81
End: 2021-01-06

## 2021-03-15 NOTE — TELEPHONE ENCOUNTER
----- Message from Ryan Rodriguez sent at 3/15/2021 11:55 AM CDT -----  Dr. Brice Solis in [place]     Needs lisinopril and carvedilol would like to get 90 days at a time.  He is almost out and needs filled ASAP.        Thanks    Ryan

## 2021-03-16 RX ORDER — CARVEDILOL 3.12 MG/1
3.12 TABLET ORAL 2 TIMES DAILY WITH MEALS
Qty: 180 TABLET | Refills: 3 | Status: SHIPPED | OUTPATIENT
Start: 2021-03-16 | End: 2022-02-28

## 2021-03-16 RX ORDER — LISINOPRIL 20 MG/1
20 TABLET ORAL DAILY
Qty: 90 TABLET | Refills: 3 | Status: SHIPPED | OUTPATIENT
Start: 2021-03-16 | End: 2022-02-28

## 2021-06-18 ENCOUNTER — OFFICE VISIT (OUTPATIENT)
Dept: CARDIOLOGY | Facility: CLINIC | Age: 81
End: 2021-06-18

## 2021-06-18 VITALS
SYSTOLIC BLOOD PRESSURE: 130 MMHG | DIASTOLIC BLOOD PRESSURE: 80 MMHG | OXYGEN SATURATION: 100 % | TEMPERATURE: 97.8 F | BODY MASS INDEX: 18.72 KG/M2 | HEART RATE: 62 BPM | HEIGHT: 72 IN | WEIGHT: 138.2 LBS

## 2021-06-18 DIAGNOSIS — I34.0 NON-RHEUMATIC MITRAL REGURGITATION: ICD-10-CM

## 2021-06-18 DIAGNOSIS — I35.1 NONRHEUMATIC AORTIC VALVE INSUFFICIENCY: ICD-10-CM

## 2021-06-18 DIAGNOSIS — G45.9 TRANSIENT CEREBRAL ISCHEMIA, UNSPECIFIED TYPE: ICD-10-CM

## 2021-06-18 DIAGNOSIS — I10 ESSENTIAL HYPERTENSION: Primary | ICD-10-CM

## 2021-06-18 PROCEDURE — 93000 ELECTROCARDIOGRAM COMPLETE: CPT | Performed by: INTERNAL MEDICINE

## 2021-06-18 PROCEDURE — 99213 OFFICE O/P EST LOW 20 MIN: CPT | Performed by: INTERNAL MEDICINE

## 2021-06-18 NOTE — PROGRESS NOTES
Babar Choi  80 y.o. male    6/18/2021     1. Essential hypertension    2. Transient cerebral ischemia, unspecified type    3. Non-rheumatic mitral regurgitation    4. Nonrheumatic aortic valve insufficiency        History of Present Illness:    Patient's Body mass index is 18.74 kg/m². BMI is below normal parameters. Recommendations include: referral to primary care.    80 years old patient presented for routine follow-up with no symptom suggestive of cardiac decompensation such orthopnea PND chest pain syncope or intermittent claudication with a background history of internal jugular vein thrombosis/TIA, hypertension with hypertensive heart disease, mild aortic and pulmonic regurgitation with preserved left and systolic function.  During the office sinus rhythm with a prolonged MT interval and right bundle branch block morphology       ECHO 6/2017  · Mild tricuspid valve regurgitation is present.  · Mild mitral valve regurgitation is present  · calcification of the aortic valve  · Mild aortic valve regurgitation is present.  · Left atrial cavity size is borderline dilated.  · Left ventricular systolic function is normal. Estimated EF = 55%.  · Left ventricular diastolic dysfunction (grade I) consistent with impaired relaxation.  · Mild pulmonic valve regurgitation is present    SUBJECTIVE:    No Known Allergies      Past Medical History:   Diagnosis Date   • Cardiac disease    • Hiatal hernia    • Hyperlipidemia    • Hypertension    • Stroke (CMS/HCC)    • Transient cerebral ischemic attack, unspecified          Past Surgical History:   Procedure Laterality Date   • SKIN LESION EXCISION           History reviewed. No pertinent family history.      Social History     Socioeconomic History   • Marital status:      Spouse name: Not on file   • Number of children: Not on file   • Years of education: Not on file   • Highest education level: Not on file   Tobacco Use   • Smoking status: Never Smoker   •  Smokeless tobacco: Never Used   Substance and Sexual Activity   • Alcohol use: No   • Drug use: No   • Sexual activity: Defer         Current Outpatient Medications   Medication Sig Dispense Refill   • apixaban (Eliquis) 2.5 MG tablet tablet Take 1 tablet by mouth Every 12 (Twelve) Hours. 180 tablet 3   • aspirin 81 MG chewable tablet Chew 81 mg Daily.     • carvedilol (COREG) 3.125 MG tablet Take 1 tablet by mouth 2 (Two) Times a Day With Meals. 180 tablet 3   • Cholecalciferol (VITAMIN D3) 5000 UNITS capsule capsule Take 5,000 Units by mouth Daily.     • FISH OIL-CANOLA OIL-VIT D3 PO Take 1 tablet by mouth Daily.     • lisinopril (PRINIVIL,ZESTRIL) 20 MG tablet Take 1 tablet by mouth Daily. 90 tablet 3   • Lutein 6 MG capsule Take 1 tablet by mouth Daily.     • Multiple Vitamins-Minerals (COMPLETE MULTIVITAMIN/MINERAL PO) Take 1 tablet by mouth Daily. Patient takes it only 3 days per week     • vitamin C (ASCORBIC ACID) 500 MG tablet Take 500 mg by mouth Daily.       No current facility-administered medications for this visit.           Review of Systems:     Constitutional:  Denies recent weight loss, weight gain,, no change in exercise tolerance.     HENT:  Denies any hearing loss, epistaxis, hoarseness, .     Eyes: No blurring    Respiratory:  Denies dyspnea with exertion,no cough, wheezing,    Cardiovascular: See H&P  Gastrointestinal:  Denies change in bowel habits, dyspepsia, ulcer disease,    Endocrine: Negative for cold intolerance, heat intolerance, polydipsia, polyphagia and polyuria.     Genitourinary: Negative.      Musculoskeletal: Denies  history of arthritic symptoms or back problems.     Skin:  Denies any change in hair or nails, rashes, or skin lesions.     Allergic/Immunologic: Negative.  Negative for environmental allergies, food allergies    Neurological: TIA    Hematological: Denies any food allergies, seasonal allergies, bleeding disorders,    Psychiatric/Behavioral: Denies any history of  "depression,      OBJECTIVE:    /80 (BP Location: Left arm, Patient Position: Sitting, Cuff Size: Adult)   Pulse 62   Temp 97.8 °F (36.6 °C)   Ht 182.9 cm (72\")   Wt 62.7 kg (138 lb 3.2 oz)   SpO2 100%   BMI 18.74 kg/m²       Physical Exam:     Constitutional: Cooperative, alert and oriented, well-developed, well-nourished, in no acute distress.     HENT:   Head: Normocephalic, conjunctive is pink thyroid is nonpalpable no jugular is distention    Cardiovascular: Regular rhythm, S1 and S2 normal, no S3 or S4. Apical impulse not displaced. No murmurs, gallops, or rubs detected.     Pulmonary/Chest: Chest: Good bilateral air entry no rales or wheezes  Abdominal: Abdomen soft, bowel sounds normoactive, no masses, no hepatosplenomegaly    Musculoskeletal: No deformities, positive peripheral pulses no cyanosis or edema    Neurological: No gross motor or sensory deficits noted, affect appropriate,    Skin: Warm and dry to the touch, no apparent skin lesions or masses noted.     Psychiatric: He has a normal mood and affect. His behavior is normal. Judgment and thought content normal.         Procedures      No results found for: WBC, HGB, HCT, MCV, PLT  No results found for: GLUCOSE, BUN, CREATININE, EGFRIFNONA, EGFRIFAFRI, BCR, CO2, CALCIUM, PROTENTOTREF, ALBUMIN, LABIL2, BILIRUBIN, AST, ALT  No results found for: CHOL  No results found for: TRIG  No results found for: HDL  No components found for: LDLCALC  No results found for: LDL  No results found for: HDLLDLRATIO  No components found for: CHOLHDL  No results found for: HGBA1C  No results found for: TSH, [id number], I8TWXDE, THYROIDAB        ASSESSMENT AND PLAN:    1 hypertension with hypertensive heart disease good blood pressure control will continue lisinopril and carvedilol    Patient counseled regarding eating habit particularly food with high sodium content    #2 mild mitral, aortic, mitral and tricuspid regurgitation    We will arrange an echocardiogram " to reassess the biventricular function and valvular regurgitation     #3 TIA and internal jugular vein thrombosis no further recurrence on anticoagulation              Diagnoses and all orders for this visit:    1. Essential hypertension (Primary)  -     ECG 12 Lead    2. Transient cerebral ischemia, unspecified type  -     Adult Transthoracic Echo Complete W/ Cont if Necessary Per Protocol; Future    3. Non-rheumatic mitral regurgitation  -     Adult Transthoracic Echo Complete W/ Cont if Necessary Per Protocol; Future    4. Nonrheumatic aortic valve insufficiency  -     Adult Transthoracic Echo Complete W/ Cont if Necessary Per Protocol; Future        Sacha Narvaez MD  6/18/2021  10:26 CDT

## 2021-06-24 LAB
QT INTERVAL: 432 MS
QTC INTERVAL: 424 MS

## 2021-11-12 ENCOUNTER — TELEPHONE (OUTPATIENT)
Dept: CARDIOLOGY | Facility: CLINIC | Age: 81
End: 2021-11-12

## 2021-11-12 NOTE — TELEPHONE ENCOUNTER
Contacted patient to let him know that we will need his yearly income and signature on his eliquis patient assistance forms. Left generic ROYER on his son's phone for call back.

## 2022-02-07 ENCOUNTER — TELEPHONE (OUTPATIENT)
Dept: CARDIOLOGY | Facility: CLINIC | Age: 82
End: 2022-02-07

## 2022-02-07 NOTE — TELEPHONE ENCOUNTER
Contacted patient and informed him that his patient has been approved for Eliquis. He voiced his understanding.

## 2022-02-28 RX ORDER — CARVEDILOL 3.12 MG/1
TABLET ORAL
Qty: 180 TABLET | Refills: 3 | Status: SHIPPED | OUTPATIENT
Start: 2022-02-28 | End: 2023-03-02

## 2022-02-28 RX ORDER — LISINOPRIL 20 MG/1
TABLET ORAL
Qty: 90 TABLET | Refills: 3 | Status: SHIPPED | OUTPATIENT
Start: 2022-02-28 | End: 2023-03-02

## 2022-06-16 DIAGNOSIS — I34.0 NON-RHEUMATIC MITRAL REGURGITATION: Primary | ICD-10-CM

## 2022-08-09 ENCOUNTER — OFFICE VISIT (OUTPATIENT)
Dept: CARDIOLOGY | Facility: CLINIC | Age: 82
End: 2022-08-09

## 2022-08-09 VITALS
DIASTOLIC BLOOD PRESSURE: 75 MMHG | BODY MASS INDEX: 18.83 KG/M2 | SYSTOLIC BLOOD PRESSURE: 137 MMHG | HEART RATE: 53 BPM | OXYGEN SATURATION: 99 % | WEIGHT: 139 LBS | HEIGHT: 72 IN

## 2022-08-09 DIAGNOSIS — I10 ESSENTIAL HYPERTENSION: Primary | ICD-10-CM

## 2022-08-09 DIAGNOSIS — I34.0 NON-RHEUMATIC MITRAL REGURGITATION: ICD-10-CM

## 2022-08-09 DIAGNOSIS — I35.1 NONRHEUMATIC AORTIC VALVE INSUFFICIENCY: ICD-10-CM

## 2022-08-09 PROCEDURE — 99214 OFFICE O/P EST MOD 30 MIN: CPT | Performed by: INTERNAL MEDICINE

## 2022-08-09 PROCEDURE — 93000 ELECTROCARDIOGRAM COMPLETE: CPT | Performed by: INTERNAL MEDICINE

## 2022-08-09 NOTE — PROGRESS NOTES
Babar Choi  81 y.o. male    8/9/2022     1. Essential hypertension    2. Non-rheumatic mitral regurgitation    3. Nonrheumatic aortic valve insufficiency    4.     Internal jugular vein thrombosis/TIA    History of Present Illness:    Patient's Body mass index is 18.85 kg/m². BMI is below normal parameters. Recommendations include: referral to primary care.    81 years old patient presented today dated 8/9/2022 for routine follow-up echocardiogram earlier today preserved left and systolic function with grade 1 relaxation abnormality mild mitral aortic and pulmonic regurgitation aortic eliquis vesicular arctic without evidence of stenosis.  Borderline dilated ascending aorta finding discussed with the patient.  EKG in the office sinus rhythm with a right bundle branch block morphology had history of similar EKG in the past.  No symptom of cardiac decompensation such orthopnea PND chest pain reported.  He does carry the medical diagnosis of internal jugular vein thrombosis/TIA, hypertension      Echo 8/9/2022    · Calculated left ventricular 3D EF = 58% Estimated left ventricular EF = 61% Left ventricular ejection fraction appears to be 61 - 65%. Left ventricular systolic function is normal.  · Left ventricular diastolic function is consistent with (grade I) impaired relaxation.  · There is calcification of the aortic valve.  · Estimated right ventricular systolic pressure from tricuspid regurgitation is normal (<35 mmHg).  · Mild dilation of the proximal aorta is present.        ECHO 6/2017  · Mild tricuspid valve regurgitation is present.  · Mild mitral valve regurgitation is present  · calcification of the aortic valve  · Mild aortic valve regurgitation is present.  · Left atrial cavity size is borderline dilated.  · Left ventricular systolic function is normal. Estimated EF = 55%.  · Left ventricular diastolic dysfunction (grade I) consistent with impaired relaxation.  · Mild pulmonic valve regurgitation is  present    SUBJECTIVE:    No Known Allergies      Past Medical History:   Diagnosis Date   • Cardiac disease    • Hiatal hernia    • Hyperlipidemia    • Hypertension    • Stroke (HCC)    • Transient cerebral ischemic attack, unspecified          Past Surgical History:   Procedure Laterality Date   • SKIN LESION EXCISION           History reviewed. No pertinent family history.      Social History     Socioeconomic History   • Marital status:    Tobacco Use   • Smoking status: Never Smoker   • Smokeless tobacco: Never Used   Substance and Sexual Activity   • Alcohol use: No   • Drug use: No   • Sexual activity: Defer         Current Outpatient Medications   Medication Sig Dispense Refill   • apixaban (Eliquis) 2.5 MG tablet tablet Take 1 tablet by mouth Every 12 (Twelve) Hours. 180 tablet 3   • aspirin 81 MG chewable tablet Chew 81 mg Daily.     • carvedilol (COREG) 3.125 MG tablet TAKE 1 TABLET BY MOUTH TWICE DAILY WITH MEALS 180 tablet 3   • Cholecalciferol (VITAMIN D3) 5000 UNITS capsule capsule Take 5,000 Units by mouth Daily.     • FISH OIL-CANOLA OIL-VIT D3 PO Take 1 tablet by mouth Daily.     • lisinopril (PRINIVIL,ZESTRIL) 20 MG tablet Take 1 tablet by mouth once daily 90 tablet 3   • Lutein 6 MG capsule Take 1 tablet by mouth Daily.     • Multiple Vitamins-Minerals (COMPLETE MULTIVITAMIN/MINERAL PO) Take 1 tablet by mouth Daily. Patient takes it only 3 days per week     • vitamin C (ASCORBIC ACID) 500 MG tablet Take 500 mg by mouth Daily.       No current facility-administered medications for this visit.           Review of Systems:   Today, dated 8/9/2022 no change was noted in the review of system compared to the previous  Constitutional:  Denies recent weight loss, weight gain,, no change in exercise tolerance.     HENT: Mild hard hearing    Eyes: No blurring    Respiratory:  Denies dyspnea with exertion,no cough, wheezing,    Cardiovascular: See H&P  Gastrointestinal:  Denies change in bowel  "habits, dyspepsia, ulcer disease,    Endocrine: Negative for cold intolerance, heat intolerance, polydipsia, polyphagia and polyuria.     Genitourinary: Negative.      Musculoskeletal: Denies  history of arthritic symptoms or back problems.     Skin:  Denies any change in hair or nails, rashes, or skin lesions.     Allergic/Immunologic: Negative.  Negative for environmental allergies, food allergies    Neurological: TIA    Hematological: Denies any food allergies, seasonal allergies, bleeding disorders,    Psychiatric/Behavioral: Denies any history of depression,      OBJECTIVE:    /75   Pulse 53   Ht 182.9 cm (72\")   Wt 63 kg (139 lb)   SpO2 99%   BMI 18.85 kg/m²       Physical Exam:   No change was noted today dated 8/9/2022 and physical exam compared to previous  Constitutional: Cooperative, alert and oriented, well-developed, well-nourished, in no acute distress.     HENT:   Head: Normocephalic, conjunctive is pink thyroid is nonpalpable no jugular is distention    Cardiovascular: Regular rhythm, S1 and S2 normal, no S3 or S4. Apical impulse not displaced. No murmurs, gallops, or rubs detected.     Pulmonary/Chest: Chest: Good bilateral air entry no rales or wheezes  Abdominal: Abdomen soft, bowel sounds normoactive, no masses, no hepatosplenomegaly    Musculoskeletal: No deformities, positive peripheral pulses no cyanosis or edema    Neurological: No gross motor or sensory deficits noted, affect appropriate,    Skin: Warm and dry to the touch, no apparent skin lesions or masses noted.     Psychiatric: He has a normal mood and affect. His behavior is normal. Judgment and thought content normal.         Procedures      No results found for: WBC, HGB, HCT, MCV, PLT  No results found for: GLUCOSE, BUN, CREATININE, EGFRIFNONA, EGFRIFAFRI, BCR, POTASSIUM, CO2, CALCIUM, PROTENTOTREF, ALBUMIN, LABIL2, BILIRUBIN, AST, ALT  No results found for: CHOL  No results found for: TRIG  No results found for: HDL  No " components found for: LDLCALC  No results found for: LDL  No results found for: HDLLDLRATIO  No components found for: CHOLHDL  No results found for: HGBA1C  No results found for: TSH, A5ECERQ, B4LUMQD, THYROIDAB        ASSESSMENT AND PLAN:  No changes noted in the physical exam had a decent blood pressure    1 hypertension with hypertensive heart disease good blood pressure control will continue lisinopril and carvedilol    Patient counseled regarding eating habit particularly food with high sodium content    #2 mild mitral, aortic, mitral and tricuspid regurgitation  No significant change noted in progression of regurgitant process on recent echo as described above     #3 TIA and internal jugular vein thrombosis no further recurrence on anticoagulation          I spent 16 minutes caring for Babar on this date of service. This time includes time spent by me of counseling/coordination of care as relates to the presenting problem and any ordered procedures/tests as outlined above.         This document has been electronically signed by Scaha Narvaez MD on August 9, 2022 09:12 CDT        Diagnoses and all orders for this visit:    1. Essential hypertension (Primary)  -     Cancel: ECG 12 Lead  -     ECG 12 Lead    2. Non-rheumatic mitral regurgitation    3. Nonrheumatic aortic valve insufficiency        Sacha Narvaez MD  8/9/2022  09:12 CDT

## 2022-08-10 LAB
QT INTERVAL: 444 MS
QTC INTERVAL: 416 MS

## 2023-03-02 RX ORDER — CARVEDILOL 3.12 MG/1
TABLET ORAL
Qty: 180 TABLET | Refills: 3 | Status: SHIPPED | OUTPATIENT
Start: 2023-03-02

## 2023-03-02 RX ORDER — LISINOPRIL 20 MG/1
TABLET ORAL
Qty: 90 TABLET | Refills: 3 | Status: SHIPPED | OUTPATIENT
Start: 2023-03-02

## 2023-09-12 ENCOUNTER — OFFICE VISIT (OUTPATIENT)
Dept: CARDIOLOGY | Facility: CLINIC | Age: 83
End: 2023-09-12
Payer: MEDICARE

## 2023-09-12 VITALS
HEIGHT: 72 IN | DIASTOLIC BLOOD PRESSURE: 84 MMHG | SYSTOLIC BLOOD PRESSURE: 132 MMHG | HEART RATE: 57 BPM | BODY MASS INDEX: 18.15 KG/M2 | OXYGEN SATURATION: 98 % | WEIGHT: 134 LBS

## 2023-09-12 DIAGNOSIS — I35.1 NONRHEUMATIC AORTIC VALVE INSUFFICIENCY: ICD-10-CM

## 2023-09-12 DIAGNOSIS — I10 ESSENTIAL HYPERTENSION: Primary | ICD-10-CM

## 2023-09-12 PROCEDURE — 99213 OFFICE O/P EST LOW 20 MIN: CPT | Performed by: INTERNAL MEDICINE

## 2023-09-12 PROCEDURE — 3079F DIAST BP 80-89 MM HG: CPT | Performed by: INTERNAL MEDICINE

## 2023-09-12 PROCEDURE — 1159F MED LIST DOCD IN RCRD: CPT | Performed by: INTERNAL MEDICINE

## 2023-09-12 PROCEDURE — 3075F SYST BP GE 130 - 139MM HG: CPT | Performed by: INTERNAL MEDICINE

## 2023-09-12 PROCEDURE — 1160F RVW MEDS BY RX/DR IN RCRD: CPT | Performed by: INTERNAL MEDICINE

## 2023-09-12 PROCEDURE — 93000 ELECTROCARDIOGRAM COMPLETE: CPT | Performed by: INTERNAL MEDICINE

## 2023-09-12 NOTE — PROGRESS NOTES
Babar Choi  83 y.o. male    9/12/2023     1. Essential hypertension    2. Nonrheumatic aortic valve insufficiency    3.     Internal jugular vein thrombosis/TIA    History of Present Illness:    Patient's Body mass index is 18.17 kg/m². BMI is below normal parameters. Recommendations include: referral to primary care.  89 years old patient presented today for routine follow-up.  EKG in the office has underlying right bundle caitie block morphology with a sinus rhythm he had a baseline prominent P wave.  No symptoms of cardiac decompensation such orthopnea PND chest pain lightheaded dizziness reported.  Echocardiogram in 2022 reported a mildly dilated ascending aorta calcification aortic valve and mild aortic regurgitation without hemodynamic significant.  No symptoms of cardiac decompensation reported.  .  He does carry the medical diagnosis of internal jugular vein thrombosis/TIA, hypertension      Echo 8/9/2022    Calculated left ventricular 3D EF = 58% Estimated left ventricular EF = 61% Left ventricular ejection fraction appears to be 61 - 65%. Left ventricular systolic function is normal.  Left ventricular diastolic function is consistent with (grade I) impaired relaxation.  There is calcification of the aortic valve.  Estimated right ventricular systolic pressure from tricuspid regurgitation is normal (<35 mmHg).  Mild dilation of the proximal aorta is present.        ECHO 6/2017  Mild tricuspid valve regurgitation is present.  Mild mitral valve regurgitation is present  calcification of the aortic valve  Mild aortic valve regurgitation is present.  Left atrial cavity size is borderline dilated.  Left ventricular systolic function is normal. Estimated EF = 55%.  Left ventricular diastolic dysfunction (grade I) consistent with impaired relaxation.  Mild pulmonic valve regurgitation is present    SUBJECTIVE:    No Known Allergies      Past Medical History:   Diagnosis Date    Cardiac disease     Hiatal hernia      Hyperlipidemia     Hypertension     Stroke     Transient cerebral ischemic attack, unspecified          Past Surgical History:   Procedure Laterality Date    SKIN LESION EXCISION           History reviewed. No pertinent family history.      Social History     Socioeconomic History    Marital status:    Tobacco Use    Smoking status: Never    Smokeless tobacco: Never   Substance and Sexual Activity    Alcohol use: No    Drug use: No    Sexual activity: Defer         Current Outpatient Medications   Medication Sig Dispense Refill    apixaban (Eliquis) 2.5 MG tablet tablet Take 1 tablet by mouth Every 12 (Twelve) Hours. 180 tablet 3    aspirin 81 MG chewable tablet Chew 1 tablet Daily.      carvedilol (COREG) 3.125 MG tablet TAKE 1 TABLET BY MOUTH TWICE DAILY WITH MEALS 180 tablet 3    Cholecalciferol (VITAMIN D3) 5000 UNITS capsule capsule Take 1 capsule by mouth Daily.      FISH OIL-CANOLA OIL-VIT D3 PO Take 1 tablet by mouth Daily.      lisinopril (PRINIVIL,ZESTRIL) 20 MG tablet Take 1 tablet by mouth once daily 90 tablet 3    Lutein 6 MG capsule Take 1 tablet by mouth Daily.      Multiple Vitamins-Minerals (COMPLETE MULTIVITAMIN/MINERAL PO) Take 1 tablet by mouth Daily. Patient takes it only 3 days per week      vitamin C (ASCORBIC ACID) 500 MG tablet Take 1 tablet by mouth Daily.       No current facility-administered medications for this visit.           Review of Systems:    no change was noted in the review of system compared to the previous  Constitutional:  Denies recent weight loss, weight gain,, no change in exercise tolerance.     HENT: Mild hard hearing    Eyes: No blurring    Respiratory:  Denies dyspnea with exertion,no cough, wheezing,    Cardiovascular: See H&P  Gastrointestinal:  Denies change in bowel habits, dyspepsia, ulcer disease,    Endocrine: Negative for cold intolerance, heat intolerance, polydipsia, polyphagia and polyuria.     Genitourinary: Negative.      Musculoskeletal: Denies  " history of arthritic symptoms or back problems.     Skin:  Denies any change in hair or nails, rashes, or skin lesions.     Allergic/Immunologic: Negative.  Negative for environmental allergies, food allergies    Neurological: TIA    Hematological: Denies any food allergies, seasonal allergies, bleeding disorders,    Psychiatric/Behavioral: Denies any history of depression,      OBJECTIVE:    /84 (BP Location: Left arm, Patient Position: Sitting, Cuff Size: Adult)   Pulse 57   Ht 182.9 cm (72\")   Wt 60.8 kg (134 lb)   SpO2 98%   BMI 18.17 kg/m²       Physical Exam:   No change was noted today dated 8/9/2022 and physical exam compared to previous  Constitutional: Cooperative, alert and oriented, well-developed, well-nourished, in no acute distress.     HENT:   Head: Normocephalic, conjunctive is pink thyroid is nonpalpable no jugular is distention    Cardiovascular: Regular rhythm, S1 and S2 normal, no S3 or S4. Apical impulse not displaced. No murmurs, gallops, or rubs detected.     Pulmonary/Chest: Chest: Good bilateral air entry no rales or wheezes  Abdominal: Abdomen soft, bowel sounds normoactive, no masses, no hepatosplenomegaly    Musculoskeletal: No deformities, positive peripheral pulses no cyanosis or edema    Neurological: No gross motor or sensory deficits noted, affect appropriate,    Skin: Warm and dry to the touch, no apparent skin lesions or masses noted.     Psychiatric: He has a normal mood and affect. His behavior is normal. Judgment and thought content normal.         Procedures      No results found for: WBC, HGB, HCT, MCV, PLT  No results found for: GLUCOSE, BUN, CREATININE, EGFRIFNONA, EGFRIFAFRI, BCR, POTASSIUM, CO2, CALCIUM, PROTENTOTREF, ALBUMIN, LABIL2, BILIRUBIN, AST, ALT  No results found for: CHOL  No results found for: TRIG  No results found for: HDL  No components found for: LDLCALC  No results found for: LDL  No results found for: HDLLDLRATIO  No components found for: " CHOLHDL  No results found for: HGBA1C  No results found for: TSH, D7YQPZZ, R7SAXVY, THYROIDAB        ASSESSMENT AND PLAN:      1 hypertension with hypertensive heart disease good blood pressure control will continue lisinopril and carvedilol    Patient counseled regarding eating habit particularly food with high sodium content    #2 mild mitral, aortic  No significant change noted in progression of regurgitant process continue clinical service     #3 TIA and internal jugular vein thrombosis no further recurrence on anticoagulation          I spent 16 minutes caring for Babar on this date of service. This time includes time spent by me of counseling/coordination of care as relates to the presenting problem and any ordered procedures/tests as outlined above.         This document has been electronically signed by Sacha Narvaez MD on September 12, 2023 14:12 CDT        Diagnoses and all orders for this visit:    1. Essential hypertension (Primary)  -     ECG 12 Lead    2. Nonrheumatic aortic valve insufficiency        Sacha Narvaez MD  9/12/2023  14:12 CDT

## 2023-09-13 LAB
QT INTERVAL: 406 MS
QTC INTERVAL: 395 MS